# Patient Record
Sex: MALE | Race: WHITE | NOT HISPANIC OR LATINO | Employment: FULL TIME | URBAN - NONMETROPOLITAN AREA
[De-identification: names, ages, dates, MRNs, and addresses within clinical notes are randomized per-mention and may not be internally consistent; named-entity substitution may affect disease eponyms.]

---

## 2022-06-08 ENCOUNTER — HOSPITAL ENCOUNTER (EMERGENCY)
Facility: HOSPITAL | Age: 22
Discharge: HOME | End: 2022-06-08
Attending: EMERGENCY MEDICINE | Admitting: EMERGENCY MEDICINE

## 2022-06-08 ENCOUNTER — APPOINTMENT (OUTPATIENT)
Dept: GENERAL RADIOLOGY | Facility: HOSPITAL | Age: 22
End: 2022-06-08

## 2022-06-08 VITALS
BODY MASS INDEX: 17.18 KG/M2 | RESPIRATION RATE: 17 BRPM | HEIGHT: 70 IN | DIASTOLIC BLOOD PRESSURE: 76 MMHG | OXYGEN SATURATION: 99 % | WEIGHT: 120 LBS | HEART RATE: 92 BPM | SYSTOLIC BLOOD PRESSURE: 127 MMHG | TEMPERATURE: 98.4 F

## 2022-06-08 DIAGNOSIS — S91.209A AVULSION OF TOENAIL, INITIAL ENCOUNTER: Primary | ICD-10-CM

## 2022-06-08 DIAGNOSIS — S92.535A CLOSED NONDISPLACED FRACTURE OF DISTAL PHALANX OF LESSER TOE OF LEFT FOOT, INITIAL ENCOUNTER: ICD-10-CM

## 2022-06-08 PROCEDURE — 99282 EMERGENCY DEPT VISIT SF MDM: CPT

## 2022-06-08 PROCEDURE — 73660 X-RAY EXAM OF TOE(S): CPT

## 2022-06-08 RX ORDER — GINSENG 100 MG
1 CAPSULE ORAL 2 TIMES DAILY
Qty: 14 G | Refills: 0 | Status: SHIPPED | OUTPATIENT
Start: 2022-06-08 | End: 2022-06-15

## 2022-06-09 NOTE — ED NOTES
Left foot and toes cleaned with hibclens, rinsed with NS and wet dsg applied to left toes awaiting physician

## 2022-06-09 NOTE — ED NOTES
"Pt arrives to ER room 15 with left foot wrapped in wet paper towel and clear bag; items removed; third digit on left foot with small amt bleeding noted, toe nail appears to have been slid to tip of toe; pt reports that \"an item fell on left foot and when I pulled my shoe off I seen the bleeding on my toe\"; pt denies pain at this time.   "

## 2022-06-09 NOTE — ED PROVIDER NOTES
Subjective   21yo male presents ED c/o blunt injury left foot digit#3 secondary to fallen object onto foot while at place of employment sustaining toenail avulsion.  ROS otherwise noncontributory.      History provided by:  Patient  Foot Injury  Location:  Toe  Toe location:  L third toe      Review of Systems   Constitutional: Negative.    HENT: Negative.    Respiratory: Negative.    Cardiovascular: Negative.    Gastrointestinal: Negative.    Skin: Positive for wound.       History reviewed. No pertinent past medical history.    No Known Allergies    History reviewed. No pertinent surgical history.    History reviewed. No pertinent family history.    Social History     Socioeconomic History   • Marital status: Single   Tobacco Use   • Smoking status: Never Smoker   Substance and Sexual Activity   • Alcohol use: Never   • Drug use: Never   • Sexual activity: Defer           Objective   Physical Exam  Vitals and nursing note reviewed.   Constitutional:       Appearance: Normal appearance.   HENT:      Head: Normocephalic and atraumatic.      Mouth/Throat:      Mouth: Mucous membranes are moist.   Eyes:      Pupils: Pupils are equal, round, and reactive to light.   Cardiovascular:      Rate and Rhythm: Normal rate and regular rhythm.      Pulses: Normal pulses.      Heart sounds: Normal heart sounds. No murmur heard.    No friction rub. No gallop.   Pulmonary:      Effort: Pulmonary effort is normal. No respiratory distress.      Breath sounds: Normal breath sounds. No wheezing, rhonchi or rales.   Abdominal:      General: Abdomen is flat. Bowel sounds are normal. There is no distension.      Palpations: Abdomen is soft.      Tenderness: There is no abdominal tenderness. There is no guarding or rebound.   Musculoskeletal:      Cervical back: Normal range of motion.      Left foot: Tenderness present. No swelling, deformity or bony tenderness.        Feet:    Skin:     General: Skin is warm and dry.   Neurological:       General: No focal deficit present.      Mental Status: He is alert and oriented to person, place, and time.         Wound Care    Date/Time: 6/8/2022 11:19 PM  Performed by: Gustavo García MD  Authorized by: Gustavo García MD     Comments:      Left foot digit#3 toenail complete avulsion noted. Digit prepped/draped sterile fashion. 1mm skin attachment of toenail removed with nail.  Xeroform gauze applied. Sterile dressing applied. Pt tolerated well.               ED Course      XR Toe 2+ View Left    Result Date: 6/8/2022  Narrative: EXAM:  XR Left Toes, 1 View CLINICAL HISTORY:  22 years old Male; toe injury. TECHNIQUE:  Oblique view of the toes of the left foot. COMPARISON:  No relevant prior studies available. FINDINGS: LIMITATIONS:  Study provided limited as only a single view is provided. BONES/JOINTS:  Possible subtle avulsion fracturing off of the plantar aspect of the third distal phalanx.  No other finding to suggest acute fracture.  Normal bony alignment. SOFT TISSUES:  Irregularity of the nail of the third digit with some associated increased density overlying the soft tissues of the distal third digit which could relate to hemorrhage.  No definite subcutaneous radiopaque foreign body seen.     Impression: - Possible subtle avulsion fracturing off of the plantar aspect of the third distal phalanx.  Clinical correlation is suggested. - Irregularity of the nail of the third digit with some associated increased density overlying the soft tissues of the distal third digit which could relate to hemorrhage.  - Study provided limited as only a single view is provided. Thank you for allowing us to participate in the care of this patient. Electronically signed by:  Aroldo Canseco MD  6/8/2022 10:46 PM CDT Workstation: [id number]                                               [facility]    Final diagnoses:   Avulsion of toenail, initial encounter   Closed nondisplaced fracture of distal phalanx of lesser toe of left foot,  initial encounter       ED Disposition  ED Disposition     ED Disposition   Discharge    Condition   Good    Comment   --             Cecil Hernandez, DPBRANDON  [address]  [address]  [facility] [address] [phone]    Schedule an appointment as soon as possible for a visit in 2 days      [facility]    In 1 day           Medication List      New Prescriptions    bacitracin 500 UNIT/GM ointment  Apply 1 application topically to the appropriate area as directed 2 (Two) Times a Day for 7 days.     diclofenac 50 MG EC tablet  Commonly known as: VOLTAREN  Take 1 tablet by mouth 3 (Three) Times a Day As Needed (pain).           Where to Get Your Medications      These medications were sent to [facility] - [place], [place] - [address] - [phone] PH - [phone]   [address] [address]    Phone: [phone]   · bacitracin 500 UNIT/GM ointment  · diclofenac 50 MG EC tablet          Gustavo García MD  06/08/22 2842

## 2022-06-09 NOTE — ED TRIAGE NOTES
Pt presents to ED with C/O ripping toenail off at work just P[employer]. Pt states something fell over on his right foot. Pt states he was sent to  from work - but not open.   Pt arrived to ED with paper towel wrapped around toe

## 2022-06-23 ENCOUNTER — TRANSCRIBE ORDERS (OUTPATIENT)
Dept: PODIATRY | Facility: CLINIC | Age: 22
End: 2022-06-23

## 2022-06-23 DIAGNOSIS — S99.922A FOOT INJURY, LEFT, INITIAL ENCOUNTER: ICD-10-CM

## 2022-06-23 DIAGNOSIS — S92.502A FRACTURE OF THIRD TOE, LEFT, CLOSED, INITIAL ENCOUNTER: ICD-10-CM

## 2022-06-23 DIAGNOSIS — S91.209A NAIL AVULSION OF TOE, INITIAL ENCOUNTER: Primary | ICD-10-CM
